# Patient Record
Sex: FEMALE | Race: WHITE | NOT HISPANIC OR LATINO | Employment: FULL TIME | URBAN - METROPOLITAN AREA
[De-identification: names, ages, dates, MRNs, and addresses within clinical notes are randomized per-mention and may not be internally consistent; named-entity substitution may affect disease eponyms.]

---

## 2018-06-09 ENCOUNTER — HOSPITAL ENCOUNTER (EMERGENCY)
Facility: HOSPITAL | Age: 28
Discharge: HOME | End: 2018-06-09
Attending: EMERGENCY MEDICINE | Admitting: EMERGENCY MEDICINE

## 2018-06-09 VITALS
BODY MASS INDEX: 28.19 KG/M2 | HEART RATE: 77 BPM | SYSTOLIC BLOOD PRESSURE: 113 MMHG | TEMPERATURE: 99 F | HEIGHT: 68 IN | OXYGEN SATURATION: 99 % | RESPIRATION RATE: 16 BRPM | DIASTOLIC BLOOD PRESSURE: 72 MMHG | WEIGHT: 186 LBS

## 2018-06-09 DIAGNOSIS — F32.A DEPRESSION, UNSPECIFIED DEPRESSION TYPE: Primary | ICD-10-CM

## 2018-06-09 LAB
AMPHET+METHAMPHET UR QL: NEGATIVE
BARBITURATES UR QL SCN: NEGATIVE
BENZODIAZ UR QL SCN: NEGATIVE
CANNABINOIDS SERPL QL: POSITIVE
COCAINE UR QL: NEGATIVE
ETHANOL BLD-MCNC: <10 MG/DL (ref 0–10)
ETHANOL UR QL: <0.01 %
HCG SERPL QL: NEGATIVE
HOLD SPECIMEN: NORMAL
METHADONE UR QL SCN: NEGATIVE
OPIATES UR QL: NEGATIVE
OXYCODONE UR QL SCN: NEGATIVE
WHOLE BLOOD HOLD SPECIMEN: NORMAL
WHOLE BLOOD HOLD SPECIMEN: NORMAL

## 2018-06-09 PROCEDURE — 80307 DRUG TEST PRSMV CHEM ANLYZR: CPT

## 2018-06-09 PROCEDURE — 84703 CHORIONIC GONADOTROPIN ASSAY: CPT

## 2018-06-09 PROCEDURE — 99284 EMERGENCY DEPT VISIT MOD MDM: CPT
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
## 2018-06-09 NOTE — ED PROVIDER NOTES
EMERGENCY DEPARTMENT ENCOUNTER    Room Number:  44/44  Date seen:  6/9/2018  Time seen: 4:01 PM  PCP: No Known Provider    HPI:  Chief complaint:SI  Context:Jerilyn FERRO is a 27 y.o. female who presents to the ED with c/o suicidal ideation that began today. Pt denies a plan and also complains of anxiety. She states recent stress from her  abandoning her last week. She is prescribed Lexapro and Vistaril for depression and anxiety by her PCP. She denies any drug or alcohol use, as well as any previous psychiatric admissions. 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     Onset: Gradual  Location:Psychiatric  Duration: Since earlier today  Timing: constant  Character:SI  Aggravating Factors: Stress from abandonment  Alleviating Factors: none stated  Severity: moderate    ALLERGIES  Azithromycin; Ceclor [cefaclor]; and Cefzil [cefprozil]    PAST MEDICAL HISTORY  Active Ambulatory Problems     Diagnosis Date Noted   • No Active Ambulatory Problems     Resolved Ambulatory Problems     Diagnosis Date Noted   • No Resolved Ambulatory Problems     Past Medical History:   Diagnosis Date   • Anxiety    • Anxiety    • Asthma    • Hypoglycemia        PAST SURGICAL HISTORY  Past Surgical History:   Procedure Laterality Date   • EAR TUBES     • EAR TUBES      had three sets   • TONSILLECTOMY         FAMILY HISTORY  Family History   Problem Relation Age of Onset   • Clotting disorder Mother        SOCIAL HISTORY  Social History     Social History   • Marital status:      Spouse name: N/A   • Number of children: N/A   • Years of education: N/A     Occupational History   • Not on file.     Social History Main Topics   • Smoking status: Never Smoker   • Smokeless tobacco: Not on file   • Alcohol use No   • Drug use: Unknown   • Sexual activity: Not on file     Other Topics Concern   • Not on file     Social History Narrative   • No narrative on file       REVIEW OF SYSTEMS  Review of Systems   Constitutional: Negative  for chills and fever.   HENT: Negative.    Eyes: Negative.    Respiratory: Negative for shortness of breath.    Cardiovascular: Negative for chest pain.   Gastrointestinal: Negative for abdominal pain.   Genitourinary: Negative.    Musculoskeletal: Negative.    Skin: Negative.    Neurological: Negative.    Psychiatric/Behavioral: Positive for suicidal ideas. The patient is nervous/anxious.        PHYSICAL EXAM  ED Triage Vitals   Temp Heart Rate Resp BP SpO2   06/09/18 1423 06/09/18 1421 06/09/18 1421 06/09/18 1423 06/09/18 1421   99 °F (37.2 °C) (!) 132 18 127/92 99 %      Temp src Heart Rate Source Patient Position BP Location FiO2 (%)   06/09/18 1423 06/09/18 1421 -- -- --   Tympanic Monitor        Physical Exam   Constitutional: She is oriented to person, place, and time and well-developed, well-nourished, and in no distress.   HENT:   Head: Normocephalic and atraumatic.   Right Ear: External ear normal.   Left Ear: External ear normal.   Nose: Nose normal.   Eyes: Conjunctivae are normal.   Neck: Normal range of motion.   Cardiovascular: Normal rate and regular rhythm.    Pulmonary/Chest: Effort normal and breath sounds normal.   Abdominal: Soft. She exhibits no distension. There is no tenderness.   Musculoskeletal: Normal range of motion.   Neurological: She is alert and oriented to person, place, and time.   Skin: Skin is warm and dry.   Psychiatric: Affect normal. Her mood appears anxious. She exhibits a depressed mood. She expresses suicidal ideation. She expresses no homicidal ideation. She expresses no suicidal plans and no homicidal plans.   Nursing note and vitals reviewed.      LAB RESULTS  Recent Results (from the past 24 hour(s))   Urine Drug Screen - Urine, Clean Catch    Collection Time: 06/09/18  3:54 PM   Result Value Ref Range    Amphet/Methamphet, Screen Negative Negative    Barbiturates Screen, Urine Negative Negative    Benzodiazepine Screen, Urine Negative Negative    Cocaine Screen, Urine  "Negative Negative    Opiate Screen Negative Negative    THC, Screen, Urine Positive (A) Negative    Methadone Screen, Urine Negative Negative    Oxycodone Screen, Urine Negative Negative   Ethanol    Collection Time: 06/09/18  4:03 PM   Result Value Ref Range    Ethanol <10 0 - 10 mg/dL    Ethanol % <0.010 %   hCG, Serum, Qualitative    Collection Time: 06/09/18  4:03 PM   Result Value Ref Range    HCG Qualitative Negative Indeterminate, Negative   Light Blue Top    Collection Time: 06/09/18  4:03 PM   Result Value Ref Range    Extra Tube hold for add-on    Green Top (Gel)    Collection Time: 06/09/18  4:03 PM   Result Value Ref Range    Extra Tube Hold for add-ons.    Lavender Top    Collection Time: 06/09/18  4:03 PM   Result Value Ref Range    Extra Tube hold for add-on        I ordered the above labs and reviewed the results        PROCEDURES  Procedures      PROGRESS AND CONSULTS    Progress Notes:    4:33 PM  Call placed to [facility]    5:28 PM  Jad of [facility] has evaluated pt and will attempt to place her in IOP.    6:22 PM  BP- 127/92 HR- 101 Temp- 99 °F (37.2 °C) (Tympanic) O2 sat- 98%  Rechecked the patient who is in NAD and is resting comfortably. Informed pt of the plan for discharge with IOP follow up and instructions to cut her anxiety medication dose in half. Jad has arranged IOP with [facility] for her. I then gave her RER instructions.Pt understands and agrees with the plan, all questions answered.    6:30 PM  Reviewed pt's history and workup with . After a bedside evaluation,  agrees with the plan of care.    Disposition vitals:  /72   Pulse 77   Temp 99 °F (37.2 °C) (Tympanic)   Resp 16   Ht 172.7 cm (68\")   Wt 84.4 kg (186 lb)   SpO2 99%   BMI 28.28 kg/m²       DIAGNOSIS  Final diagnoses:   Depression, unspecified depression type       FOLLOW UP   PATIENT LIAISON [facility] [id number] [phone]  Schedule an appointment as soon as possible for a " visit in 2 days  to establish a primary care provider      RX     Medication List      No changes were made to your prescriptions during this visit.         Documentation assistance provided by loulou Carlin for Alida Lundberg PA-C.  Information recorded by the [facility] was done at my direction and has been verified and validated by me.           Vinny Carlin  06/09/18 1845       JEANINE Escobar  06/09/18 1916

## 2018-06-09 NOTE — ED NOTES
"Pt reports having \"high anxiety for some time\". Pt states that last night she began having suicidal thoughts. Denies plan. Pt grandma at bedside. Suicide precautions in place. Security at bedside.      Yissel Smith RN  06/09/18 1075    "

## 2018-06-09 NOTE — ED NOTES
"C/o \"high anxiety and suicidal thoughts\" for past three weeks and is worse today. Denies plan. Denies HI. Here w grandmother who reports pt \"never going to sleep.\" Pt reports not eating in three days due to low appetite and not feeling well w diarrhea/vomiting. Denies urinary symptoms. Denies taking meds inappropriately. Denies drugs/etoh    +frequent crying and difficulty focusing. \"I can barely take care of my kids.\"      Kids are 7 y.o, 3 y.o, and 2 months.      has recently frequently not been present \"He basically walked out and just comes home to change his clothes.\"     Rosario Yarbrough, RN  06/09/18 1430    "

## 2018-06-09 NOTE — DISCHARGE INSTRUCTIONS
Follow up with [facility] as discussed.  You can try taking the hydroxyzine at a 1/2 to 1 tablet every 3-6 hours to see if that minimizes the drowsiness.  Return to the ER or call 911 for suicidal thoughts, as needed.

## 2018-06-09 NOTE — ED PROVIDER NOTES
MD ATTESTATION NOTE    The [facility] and I have discussed this patient's history, physical exam, and treatment plan.  I have reviewed the documentation and personally had a face to face interaction with the patient. I affirm the documentation and agree with the treatment and plan.  The attached note describes my personal findings.    Pt presents to the ED complaining of dysphoric mood and SI that began today. Pt denies plan, self injury, and HI.     On exam, pt is AOx3, in NAD, is calm and non tearful. Pt makes good eye contact, but has flat affect. Pt states she is willing to participate in intensive outpatient program, and states she would like to stay alive for her children.     I agree with midlevel plan to discharge for outpatient therapy due to medical clearance and Access recommendation.     Documentation assistance provided by loulou Parra for Dr. Sparks.  Information recorded by the scribe was done at my direction and has been verified and validated by me.       Janet Parra  06/09/18 [id number]       Urban Sparks MD  06/10/18 0156

## 2018-06-09 NOTE — ED NOTES
Pt denies being able to attempt a urine sample at this time.     Sitting w grandmother in direct line of staff while waiting for room in ED to be prepared for her.      Rosario Yarbrough RN  06/09/18 0499